# Patient Record
Sex: FEMALE | Race: WHITE | NOT HISPANIC OR LATINO | URBAN - METROPOLITAN AREA
[De-identification: names, ages, dates, MRNs, and addresses within clinical notes are randomized per-mention and may not be internally consistent; named-entity substitution may affect disease eponyms.]

---

## 2022-09-12 ENCOUNTER — CLAIMS CREATED FROM THE CLAIM WINDOW (OUTPATIENT)
Dept: URBAN - METROPOLITAN AREA CLINIC 113 | Facility: CLINIC | Age: 47
End: 2022-09-12
Payer: COMMERCIAL

## 2022-09-12 ENCOUNTER — LAB OUTSIDE AN ENCOUNTER (OUTPATIENT)
Dept: URBAN - METROPOLITAN AREA CLINIC 113 | Facility: CLINIC | Age: 47
End: 2022-09-12

## 2022-09-12 ENCOUNTER — TELEPHONE ENCOUNTER (OUTPATIENT)
Dept: URBAN - METROPOLITAN AREA CLINIC 113 | Facility: CLINIC | Age: 47
End: 2022-09-12

## 2022-09-12 VITALS
DIASTOLIC BLOOD PRESSURE: 92 MMHG | TEMPERATURE: 98.2 F | HEART RATE: 71 BPM | BODY MASS INDEX: 42.69 KG/M2 | SYSTOLIC BLOOD PRESSURE: 138 MMHG | WEIGHT: 232 LBS | HEIGHT: 62 IN

## 2022-09-12 DIAGNOSIS — Z12.11 SCREENING FOR COLON CANCER: ICD-10-CM

## 2022-09-12 DIAGNOSIS — R09.89 GLOBUS SENSATION: ICD-10-CM

## 2022-09-12 DIAGNOSIS — R09.89 THROAT CLEARING: ICD-10-CM

## 2022-09-12 PROCEDURE — 99203 OFFICE O/P NEW LOW 30 MIN: CPT | Performed by: INTERNAL MEDICINE

## 2022-09-12 RX ORDER — SALICYLIC ACID 3 G/100G
1 TABLET SWALLOW WHOLE WITH WATER; DO NOT TAKE WITH FRUIT JUICES LOTION TOPICAL ONCE A DAY
Status: ACTIVE | COMMUNITY

## 2022-09-12 RX ORDER — SODIUM, POTASSIUM,MAG SULFATES 17.5-3.13G
354ML SOLUTION, RECONSTITUTED, ORAL ORAL
Qty: 354 MILLILITER | Refills: 0 | OUTPATIENT
Start: 2022-09-12 | End: 2022-09-13

## 2022-09-12 NOTE — HPI-TODAY'S VISIT:
New patient 47 year old female presents in office for evaluation of possible GERD symptoms. She reports back in December she got sick with a sore throat. She tested negative for COVID and Strep, but was given antibiotics. She got relief with the medication, but the sore throat returned about 1 week after completion. She also began to develop mucous in the back of her throat. The symptoms have been most notable at night and first thing in the morning. She saw Dr. Belcher, ENT, in January for work up. She was perscribed omeprazole 40mg that she took from January until May with no improvement. She also had a sleep study which revealed mild sleep apnea. In February she had a laryngoscopy, which was unremarkable. Her current symptoms include sore throat/throat pain, and mucous in her posterior throat.  She is due for her screening colonoscopy. She has never had a colonoscopy and has no known family history of colon cancer or polyps. She reports regular bowel movements without straining, mucous or blood.

## 2022-09-27 ENCOUNTER — OFFICE VISIT (OUTPATIENT)
Dept: URBAN - METROPOLITAN AREA CLINIC 107 | Facility: CLINIC | Age: 47
End: 2022-09-27

## 2022-11-07 ENCOUNTER — OFFICE VISIT (OUTPATIENT)
Dept: URBAN - METROPOLITAN AREA SURGERY CENTER 25 | Facility: SURGERY CENTER | Age: 47
End: 2022-11-07
Payer: COMMERCIAL

## 2022-11-07 DIAGNOSIS — F45.8 GLOBUS SENSATION: ICD-10-CM

## 2022-11-07 PROCEDURE — 43235 EGD DIAGNOSTIC BRUSH WASH: CPT | Performed by: INTERNAL MEDICINE

## 2022-11-07 RX ORDER — SALICYLIC ACID 3 G/100G
1 TABLET SWALLOW WHOLE WITH WATER; DO NOT TAKE WITH FRUIT JUICES LOTION TOPICAL ONCE A DAY
Status: ACTIVE | COMMUNITY

## 2022-11-15 ENCOUNTER — WEB ENCOUNTER (OUTPATIENT)
Dept: URBAN - METROPOLITAN AREA SURGERY CENTER 25 | Facility: SURGERY CENTER | Age: 47
End: 2022-11-15

## 2022-11-15 PROBLEM — 267103008 GLOBUS SENSATION: Status: ACTIVE | Noted: 2022-11-15

## 2022-11-21 ENCOUNTER — OFFICE VISIT (OUTPATIENT)
Dept: URBAN - METROPOLITAN AREA SURGERY CENTER 25 | Facility: SURGERY CENTER | Age: 47
End: 2022-11-21
Payer: COMMERCIAL

## 2022-11-21 DIAGNOSIS — D12.4 ADENOMA OF DESCENDING COLON: ICD-10-CM

## 2022-11-21 DIAGNOSIS — Z12.11 SCREENING FOR COLON CANCER: ICD-10-CM

## 2022-11-21 PROCEDURE — G8907 PT DOC NO EVENTS ON DISCHARG: HCPCS | Performed by: INTERNAL MEDICINE

## 2022-11-21 PROCEDURE — 45385 COLONOSCOPY W/LESION REMOVAL: CPT | Performed by: INTERNAL MEDICINE

## 2022-11-21 RX ORDER — SALICYLIC ACID 3 G/100G
1 TABLET SWALLOW WHOLE WITH WATER; DO NOT TAKE WITH FRUIT JUICES LOTION TOPICAL ONCE A DAY
Status: ACTIVE | COMMUNITY

## 2022-12-08 ENCOUNTER — OFFICE VISIT (OUTPATIENT)
Dept: URBAN - METROPOLITAN AREA CLINIC 113 | Facility: CLINIC | Age: 47
End: 2022-12-08

## 2023-07-10 ENCOUNTER — OFFICE VISIT (OUTPATIENT)
Dept: URBAN - METROPOLITAN AREA CLINIC 113 | Facility: CLINIC | Age: 48
End: 2023-07-10
Payer: COMMERCIAL

## 2023-07-10 ENCOUNTER — DASHBOARD ENCOUNTERS (OUTPATIENT)
Age: 48
End: 2023-07-10

## 2023-07-10 VITALS
WEIGHT: 238 LBS | DIASTOLIC BLOOD PRESSURE: 90 MMHG | HEART RATE: 76 BPM | HEIGHT: 62 IN | BODY MASS INDEX: 43.79 KG/M2 | SYSTOLIC BLOOD PRESSURE: 130 MMHG | TEMPERATURE: 97.59 F | RESPIRATION RATE: 14 BRPM

## 2023-07-10 DIAGNOSIS — R79.89 ELEVATED FERRITIN: ICD-10-CM

## 2023-07-10 DIAGNOSIS — Z86.010 HISTORY OF COLON POLYPS: ICD-10-CM

## 2023-07-10 PROBLEM — 428283002: Status: ACTIVE | Noted: 2023-07-10

## 2023-07-10 PROCEDURE — 99214 OFFICE O/P EST MOD 30 MIN: CPT | Performed by: INTERNAL MEDICINE

## 2023-07-10 PROCEDURE — 99244 OFF/OP CNSLTJ NEW/EST MOD 40: CPT | Performed by: INTERNAL MEDICINE

## 2023-07-10 RX ORDER — SALICYLIC ACID 3 G/100G
1 TABLET SWALLOW WHOLE WITH WATER; DO NOT TAKE WITH FRUIT JUICES LOTION TOPICAL ONCE A DAY
Status: ON HOLD | COMMUNITY

## 2023-07-10 NOTE — HPI-TODAY'S VISIT:
New patient 47 year old female presents in office for evaluation of possible GERD symptoms. She reports back in December she got sick with a sore throat. She tested negative for COVID and Strep, but was given antibiotics. She got relief with the medication, but the sore throat returned about 1 week after completion. She also began to develop mucous in the back of her throat. The symptoms have been most notable at night and first thing in the morning. She saw Dr. Belcher, ENT, in January for work up. She was perscribed omeprazole 40 mg that she took from January until May with no improvement. She also had a sleep study which revealed mild sleep apnea. In February, she had a laryngoscopy, which was unremarkable. Her current symptoms include sore throat/throat pain, and mucous in her posterior throat.  She is due for her screening colonoscopy. She has never had a colonoscopy and has no known family history of colon cancer or polyps. She reports regular bowel movements without straining, mucous or blood.  Interval history, 7/10/2023: 47-year-old female presents for long interval follow-up and evaluation of elevated ferritin levels.  She has been referred by Dr. Lauro Silveira.  A copy of today's visit will be forwarded to the referring provider. She was last seen in the office on 9/12/2022 for complaints of throat clearing and globus sensation.  This was likely not due to GERD as PPI therapy was ineffective though she was planned for EGD to rule out reflux or infection.  She was also recommended a screening colonoscopy. EGD 11/7/2022:Performed without difficulty.  Esophagus, stomach and duodenum were normal.  No specimens collected. Colonoscopy 11/21/2022:Performed without difficulty.  BBPS score of 9.  TI was normal.  Removal of a 6 mm tubular adenoma in the proximal descending colon.  Diverticulosis in the sigmoid colon and hepatic flexure.  Repeat colonoscopy in 7 years for surveillance.  Labs 6/26/2023:Elevated serum ferritin 258, low vitamin D 25-hydroxy 28.3, elevated total cholesterol 205, normal CMP.  Her first sign of elevated ferritin was in December 2022. The level was 350. This test performed while infected with Covid. THis was suspected to be an acute phase reactant. She did take a 2 dose course of treatment for the Covid infection. She cannot recall the name. She had repeat labs in March of this year which revealed the ferritin remained high though improved to 289. She actually tested positive for Covid again in February prior to that. Ferritin has now improved to 258.   She may consume red meat twice per week. She drinks ETOH maybe once every few months at the most. She dislikes ETOH due to secondary headaches. SHe has had elevated liver enzymes after her first pregnancy, though this resolved. No family history of liver disease or autoimmune diseases. She denies any recent iron infusions or oral supplementation. She would take oral iron sporadically in the past for iron deficiency but it has been over two years.

## 2023-07-11 LAB
A/G RATIO: 1.4
ABSOLUTE BASOPHILS: 26
ABSOLUTE EOSINOPHILS: 252
ABSOLUTE LYMPHOCYTES: 2453
ABSOLUTE MONOCYTES: 583
ABSOLUTE NEUTROPHILS: 5385
ALBUMIN: 4.1
ALKALINE PHOSPHATASE: 79
ALT (SGPT): 18
AST (SGOT): 20
BASOPHILS: 0.3
BILIRUBIN, TOTAL: 0.2
BUN/CREATININE RATIO: (no result)
BUN: 14
CALCIUM: 9.5
CARBON DIOXIDE, TOTAL: 24
CHLORIDE: 106
CREATININE: 0.89
EGFR: 80
EOSINOPHILS: 2.9
FERRITIN, SERUM: 169
GLOBULIN, TOTAL: 3
GLUCOSE: 99
HEMATOCRIT: 40.7
HEMOGLOBIN: 13.3
IRON BIND.CAP.(TIBC): 303
IRON SATURATION: 11
IRON: 34
LYMPHOCYTES: 28.2
MCH: 28.9
MCHC: 32.7
MCV: 88.5
MONOCYTES: 6.7
MPV: 12
NEUTROPHILS: 61.9
PLATELET COUNT: 272
POTASSIUM: 4
PROTEIN, TOTAL: 7.1
RDW: 13.6
RED BLOOD CELL COUNT: 4.6
SODIUM: 143
WHITE BLOOD CELL COUNT: 8.7

## 2023-09-22 ENCOUNTER — OFFICE VISIT (OUTPATIENT)
Dept: URBAN - METROPOLITAN AREA CLINIC 107 | Facility: CLINIC | Age: 48
End: 2023-09-22

## 2025-04-09 NOTE — PHYSICAL EXAM HENT:
Head, normocephalic, atraumatic, Face, Face within normal limits, Ears, External ears within normal limits
(4) no impairment

## 2025-07-29 ENCOUNTER — OFFICE VISIT (OUTPATIENT)
Dept: URBAN - METROPOLITAN AREA CLINIC 107 | Facility: CLINIC | Age: 50
End: 2025-07-29

## 2025-07-29 RX ORDER — SALICYLIC ACID 3 G/100G
1 TABLET SWALLOW WHOLE WITH WATER; DO NOT TAKE WITH FRUIT JUICES LOTION TOPICAL ONCE A DAY
Status: ON HOLD | COMMUNITY

## 2025-07-29 NOTE — HPI-TODAY'S VISIT:
Last seen 7/10/2023 for elevated ferritin labs ordered.  Consider hemochromatosis testing pending results.  Due for surveillance colonoscopy 2029 for history of colon polyps. Interval history, 7/29/2025 Labs 6/13/2025.  CBC normal with Hgb 14.1.  CMP, thyroid studies normal.  Hemoglobin A1c 5.7.  Vitamin D normal.